# Patient Record
Sex: FEMALE | Race: WHITE | NOT HISPANIC OR LATINO | Employment: OTHER | URBAN - NONMETROPOLITAN AREA
[De-identification: names, ages, dates, MRNs, and addresses within clinical notes are randomized per-mention and may not be internally consistent; named-entity substitution may affect disease eponyms.]

---

## 2017-05-31 ENCOUNTER — TELEPHONE (OUTPATIENT)
Dept: NEUROLOGY | Facility: CLINIC | Age: 42
End: 2017-05-31

## 2017-07-10 RX ORDER — SUMATRIPTAN 100 MG/1
100 TABLET, FILM COATED ORAL ONCE AS NEEDED
Qty: 9 TABLET | Refills: 2 | Status: SHIPPED | OUTPATIENT
Start: 2017-07-10 | End: 2017-07-18 | Stop reason: SDUPTHER

## 2017-07-10 RX ORDER — RIZATRIPTAN BENZOATE 10 MG/1
10 TABLET ORAL ONCE AS NEEDED
Qty: 9 TABLET | Refills: 2 | Status: SHIPPED | OUTPATIENT
Start: 2017-07-10 | End: 2017-07-18 | Stop reason: SDUPTHER

## 2017-07-10 NOTE — TELEPHONE ENCOUNTER
She does not have any refills left on her Imitrex or her Maxalt. She uses [facility].     She also would like to discuss Botox at her follow up.

## 2017-07-18 ENCOUNTER — OFFICE VISIT (OUTPATIENT)
Dept: NEUROLOGY | Facility: CLINIC | Age: 42
End: 2017-07-18

## 2017-07-18 VITALS
HEART RATE: 72 BPM | DIASTOLIC BLOOD PRESSURE: 80 MMHG | SYSTOLIC BLOOD PRESSURE: 124 MMHG | WEIGHT: 220 LBS | BODY MASS INDEX: 36.65 KG/M2 | HEIGHT: 65 IN

## 2017-07-18 DIAGNOSIS — G43.119 INTRACTABLE MIGRAINE WITH AURA WITHOUT STATUS MIGRAINOSUS: Primary | ICD-10-CM

## 2017-07-18 PROCEDURE — 64400 NJX AA&/STRD TRIGEMINAL NRV: CPT | Performed by: PHYSICIAN ASSISTANT

## 2017-07-18 PROCEDURE — 99214 OFFICE O/P EST MOD 30 MIN: CPT | Performed by: PHYSICIAN ASSISTANT

## 2017-07-18 RX ORDER — ONDANSETRON 8 MG/1
8 TABLET, ORALLY DISINTEGRATING ORAL EVERY 8 HOURS PRN
Qty: 30 TABLET | Refills: 3 | Status: SHIPPED | OUTPATIENT
Start: 2017-07-18 | End: 2023-01-25

## 2017-07-18 RX ORDER — RIZATRIPTAN BENZOATE 10 MG/1
10 TABLET ORAL ONCE AS NEEDED
Qty: 9 TABLET | Refills: 2 | Status: SHIPPED | OUTPATIENT
Start: 2017-07-18 | End: 2018-04-17 | Stop reason: SDUPTHER

## 2017-07-18 RX ORDER — SUMATRIPTAN 100 MG/1
100 TABLET, FILM COATED ORAL ONCE AS NEEDED
Qty: 9 TABLET | Refills: 3 | Status: SHIPPED | OUTPATIENT
Start: 2017-07-18 | End: 2023-01-25

## 2017-07-18 RX ORDER — METHYLPREDNISOLONE ACETATE 40 MG/ML
12.31 INJECTION, SUSPENSION INTRA-ARTICULAR; INTRALESIONAL; INTRAMUSCULAR; SOFT TISSUE ONCE
Status: COMPLETED | OUTPATIENT
Start: 2017-07-18 | End: 2017-07-18

## 2017-07-18 RX ORDER — TOPIRAMATE 25 MG/1
25 TABLET ORAL 2 TIMES DAILY
Qty: 60 TABLET | Refills: 3 | Status: SHIPPED | OUTPATIENT
Start: 2017-07-18 | End: 2017-08-24 | Stop reason: SDUPTHER

## 2017-07-18 RX ADMIN — METHYLPREDNISOLONE ACETATE 12.31 MG: 40 INJECTION, SUSPENSION INTRA-ARTICULAR; INTRALESIONAL; INTRAMUSCULAR; SOFT TISSUE at 10:52

## 2017-07-18 RX ADMIN — METHYLPREDNISOLONE ACETATE 12.31 MG: 40 INJECTION, SUSPENSION INTRA-ARTICULAR; INTRALESIONAL; INTRAMUSCULAR; SOFT TISSUE at 10:50

## 2017-07-18 NOTE — PROGRESS NOTES
Procedure   Nerve Block  Date/Time: 7/18/2017 12:59 PM  Performed by: CYNTHIA DE OLIVEIRA  Authorized by: CYNTHIA DE OLIVEIRA   Consent: Verbal consent obtained.  Risks and benefits: risks, benefits and alternatives were discussed  Consent given by: patient  Patient understanding: patient states understanding of the procedure being performed  Patient consent: the patient's understanding of the procedure matches consent given  Procedure consent: procedure consent matches procedure scheduled  Relevant documents: relevant documents present and verified  Test results: test results available and properly labeled  Site marked: the operative site was not marked  Imaging studies: imaging studies not available  Required items: required blood products, implants, devices, and special equipment available  Patient identity confirmed: verbally with patient  Indications: pain relief  Body area: head  Nerve: trigeminal  Laterality: left  Sedation:  Patient sedated: no    Preparation: Patient was prepped and draped in the usual sterile fashion.  Patient position: sitting  Needle size: 27 G  Location technique: anatomical landmarks  Local Anesthetic: lidocaine 1% without epinephrine   Anesthetic total: 0.7 mL  Outcome: pain improved  Patient tolerance: Patient tolerated the procedure well with no immediate complications

## 2017-07-18 NOTE — PROGRESS NOTES
"Subjective   Britta Patel is a 41 y.o. female is here today for follow-up.    HPI Comments: The patient returns in follow-up, she has not been seen in almost a year.  During this time she is continues to have migraine headaches.  She is also had some issues with her underlying psychiatric disease and has been hospitalized.  She was recently hospitalized in [place].  She returns today with increased frequency and intensity of her headaches.    Patient also has episodes that she describes as \"nonepileptic seizures\" that are now occurring with decreased regularity in intensity.  She feels this is been a positive response to Tegretol which has been maintained since she was last seen.    The patient is no longer taking topiramate which she feels was very helpful for her headaches, but had caused him side effects which in retrospect she feels were not very bad.    Patient is continuing to take Imitrex and occasional Maxalt as she had previously, with mixed results.    Her headaches continue to be associated with photophobia, phonophobia, nausea, emesis, loss of balance and are frequently associated with left sided dominant pain.  She typically has an aura starting about 2 hours prior to the headache.  She is having typical aura symptoms today.    Migraine    This is a chronic problem. The current episode started more than 1 year ago. The problem occurs intermittently (Greater than 15 headache days per month, greater than 4 hours per occurrence). The problem has been gradually worsening. The pain is located in the bilateral, frontal, retro-orbital and temporal region. The pain does not radiate. The pain quality is similar to prior headaches. The quality of the pain is described as aching, throbbing and pulsating. The pain is severe. Associated symptoms include blurred vision, dizziness, eye pain, a loss of balance, nausea, phonophobia, photophobia, scalp tenderness, seizures, a visual change and vomiting. Pertinent " negatives include no fever, hearing loss or weakness. The symptoms are aggravated by activity, bright light, fatigue, emotional stress, noise and Valsalva. She has tried acetaminophen, antidepressants, beta blockers, cold packs, darkened room, Excedrin, injectable narcotics, NSAIDs and triptans for the symptoms. The treatment provided mild relief. Her past medical history is significant for migraine headaches and migraines in the family.       The following portions of the patient's history were reviewed and updated as appropriate: allergies, current medications, past family history, past medical history, past social history, past surgical history and problem list.    Review of Systems   Constitutional: Positive for fatigue. Negative for chills and fever.   HENT: Negative for hearing loss, nosebleeds and trouble swallowing.    Eyes: Positive for blurred vision, photophobia, pain and visual disturbance.   Respiratory: Negative.    Cardiovascular: Negative.    Gastrointestinal: Positive for nausea and vomiting.   Endocrine: Negative.    Genitourinary: Negative.    Musculoskeletal: Negative.    Skin: Negative.    Allergic/Immunologic: Negative.    Neurological: Positive for dizziness, seizures, headaches and loss of balance. Negative for facial asymmetry, speech difficulty and weakness.   Hematological: Negative.    Psychiatric/Behavioral: Positive for confusion, decreased concentration, dysphoric mood and sleep disturbance. The patient is nervous/anxious.        Objective   Physical Exam   Constitutional: She is oriented to person, place, and time. Vital signs are normal. She appears well-developed and well-nourished. No distress.   HENT:   Head: Normocephalic and atraumatic.   Right Ear: Hearing and external ear normal.   Left Ear: Hearing and external ear normal.   Nose: Nose normal.   Mouth/Throat: Uvula is midline, oropharynx is clear and moist and mucous membranes are normal.   Bitemporal left greater than right  tenderness to palpation with symmetric temporalis pulses.   Eyes: Conjunctivae, EOM and lids are normal. Pupils are equal, round, and reactive to light. No scleral icterus.   Fundoscopic exam:       The right eye shows no hemorrhage and no papilledema. The right eye shows red reflex.        The left eye shows no hemorrhage and no papilledema. The left eye shows red reflex.   Neck: Normal range of motion and phonation normal. No spinous process tenderness and no muscular tenderness present. Carotid bruit is not present. Normal range of motion present. No thyroid mass and no thyromegaly present.   Cardiovascular: Normal rate, regular rhythm, S1 normal, S2 normal and normal heart sounds.    No murmur heard.  Pulmonary/Chest: Effort normal and breath sounds normal. No stridor. No respiratory distress. She has no wheezes. She has no rales.   Musculoskeletal: Normal range of motion. She exhibits no edema or tenderness.        Lumbar back: Normal.   Lymphadenopathy:     She has no cervical adenopathy.   Neurological: She is alert and oriented to person, place, and time. She has normal strength. She displays no atrophy and no tremor. No cranial nerve deficit or sensory deficit. She exhibits normal muscle tone. Coordination and gait normal. GCS eye subscore is 4. GCS verbal subscore is 5. GCS motor subscore is 6.   Reflex Scores:       Tricep reflexes are 2+ on the right side and 2+ on the left side.       Bicep reflexes are 2+ on the right side and 2+ on the left side.       Brachioradialis reflexes are 2+ on the right side and 2+ on the left side.       Patellar reflexes are 2+ on the right side and 2+ on the left side.       Achilles reflexes are 2+ on the right side and 2+ on the left side.  Skin: Skin is warm and dry. No ecchymosis, no lesion and no rash noted. No cyanosis. Nails show no clubbing.   Psychiatric: She has a normal mood and affect. Her speech is normal and behavior is normal. Judgment and thought content  normal. She is not actively hallucinating. Cognition and memory are normal. She is attentive.   Nursing note and vitals reviewed.        Assessment/Plan   Britta was seen today for migraine and seizures.    Diagnoses and all orders for this visit:    Intractable migraine with aura without status migrainosus  -     topiramate (TOPAMAX) 25 MG tablet; Take 1 tablet by mouth 2 (Two) Times a Day.  -     methylPREDNISolone acetate (DEPO-medrol) injection 12.31 mg; 0.31 mL by Peripheral Nerve route 1 (One) Time.  -     methylPREDNISolone acetate (DEPO-medrol) injection 12.31 mg; 0.31 mL by Peripheral Nerve route 1 (One) Time.  -     Ambulatory Referral to Neurology  -     Ambulatory Referral to Neurology  -     SUMAtriptan (IMITREX) 100 MG tablet; Take 1 tablet by mouth 1 (One) Time As Needed for Migraine. Do not exceed 2 in 24 hours  -     rizatriptan (MAXALT) 10 MG tablet; Take 1 tablet by mouth 1 (One) Time As Needed for Migraine for up to 1 dose. May repeat in 2 hours if needed  -     ondansetron ODT (ZOFRAN-ODT) 8 MG disintegrating tablet; Take 1 tablet by mouth Every 8 (Eight) Hours As Needed for Nausea or Vomiting.  -     CBC & Differential  -     Hepatic Function Panel  -     Carbamazepine Level, Total    The patient will reinitiate topiramate titrating to 25 mg twice per day over the next 2 weeks.    She will continue sumatriptan, Zofran and Maxalt as she has previously on an as-needed basis.    Patient is a candidate for Botox therapy given the nature, frequency and intensity of her headaches.    The patient is a candidate for bilateral auricular temporal branch blocks, given her evolving headache today we have offered to accomplish these today.    The patient relates that she has not had any recent blood work and we have recommended a CBC, LFT and Tegretol level.    20 minutes of a 30 minute outpatient visit was spent in counseling and coordination of care today.  This time was above and beyond that required  for bilateral auricular temporal branch blocks which are documented separately.          EMR Dragon transcription disclaimer:  Much of this encounter note is an electronic transcription/translation of spoken language to printed text.  The electronic translation of spoken language may permit erroneous, or at times, nonsensical words or phrases to be inadvertently transcribed.  The author has reviewed the note for such errors, however some may still exist.

## 2017-07-18 NOTE — PROGRESS NOTES
Procedure   Nerve Block  Date/Time: 7/18/2017 1:02 PM  Performed by: CYNTHIA DE OLIVEIRA  Authorized by: CYNTHIA DE OLIVEIRA   Consent: Verbal consent obtained.  Risks and benefits: risks, benefits and alternatives were discussed  Consent given by: patient  Patient understanding: patient states understanding of the procedure being performed  Patient consent: the patient's understanding of the procedure matches consent given  Procedure consent: procedure consent matches procedure scheduled  Relevant documents: relevant documents present and verified  Test results: test results available and properly labeled  Site marked: the operative site was not marked  Imaging studies: imaging studies not available  Required items: required blood products, implants, devices, and special equipment available  Patient identity confirmed: verbally with patient  Indications: pain relief  Body area: head  Nerve: trigeminal  Laterality: right  Sedation:  Patient sedated: no    Preparation: Patient was prepped and draped in the usual sterile fashion.  Patient position: sitting  Needle size: 27 G  Location technique: anatomical landmarks  Local Anesthetic: lidocaine 1% without epinephrine   Anesthetic total: 0.7 mL  Outcome: pain improved  Patient tolerance: Patient tolerated the procedure well with no immediate complications

## 2017-08-07 RX ORDER — BUTALBITAL, ACETAMINOPHEN AND CAFFEINE 50; 325; 40 MG/1; MG/1; MG/1
1 TABLET ORAL EVERY 6 HOURS PRN
Qty: 15 TABLET | Refills: 0 | OUTPATIENT
Start: 2017-08-07 | End: 2017-08-25

## 2017-08-24 ENCOUNTER — TELEPHONE (OUTPATIENT)
Dept: NEUROLOGY | Facility: CLINIC | Age: 42
End: 2017-08-24

## 2017-08-24 DIAGNOSIS — G43.119 INTRACTABLE MIGRAINE WITH AURA WITHOUT STATUS MIGRAINOSUS: Primary | ICD-10-CM

## 2017-08-24 RX ORDER — TOPIRAMATE 50 MG/1
50 TABLET, FILM COATED ORAL 2 TIMES DAILY
Qty: 60 TABLET | Refills: 3 | Status: SHIPPED | OUTPATIENT
Start: 2017-08-24 | End: 2023-01-25 | Stop reason: ALTCHOICE

## 2017-08-24 NOTE — TELEPHONE ENCOUNTER
Pt has called with a migraine for the last 18 hours. She would like to know if she could come in for another nerve block or increase her medication.     I did not want her going in to the weekend with this migraine and end up at the ED.   She is not having any relief with the abortive medications she is prescribed.   I have put her on the schedule tomorrow 8-25-17 at 1:20pm for the procedure only. And I did explain to her how the visit would be a procedure only due to working her in on the schedule.     She did let me know that she would like for her Topamax increased if possible and to make BERNA Lockhart aware of her request before the appointment- being that it will be a procedure only appt.

## 2017-08-24 NOTE — TELEPHONE ENCOUNTER
I sent in a prescription for Topamax 50 mg twice per day.    The procedure visit that you have scheduled is okay with me.

## 2017-08-25 ENCOUNTER — OFFICE VISIT (OUTPATIENT)
Dept: NEUROLOGY | Facility: CLINIC | Age: 42
End: 2017-08-25

## 2017-08-25 VITALS
HEIGHT: 65 IN | HEART RATE: 76 BPM | BODY MASS INDEX: 36.65 KG/M2 | WEIGHT: 220 LBS | SYSTOLIC BLOOD PRESSURE: 120 MMHG | DIASTOLIC BLOOD PRESSURE: 80 MMHG

## 2017-08-25 DIAGNOSIS — G43.119 INTRACTABLE MIGRAINE WITH AURA WITHOUT STATUS MIGRAINOSUS: Primary | ICD-10-CM

## 2017-08-25 PROCEDURE — 64400 NJX AA&/STRD TRIGEMINAL NRV: CPT | Performed by: PHYSICIAN ASSISTANT

## 2017-08-25 RX ORDER — METHYLPREDNISOLONE ACETATE 40 MG/ML
12.31 INJECTION, SUSPENSION INTRA-ARTICULAR; INTRALESIONAL; INTRAMUSCULAR; SOFT TISSUE ONCE
Status: COMPLETED | OUTPATIENT
Start: 2017-08-25 | End: 2017-08-25

## 2017-08-25 RX ADMIN — METHYLPREDNISOLONE ACETATE 12.31 MG: 40 INJECTION, SUSPENSION INTRA-ARTICULAR; INTRALESIONAL; INTRAMUSCULAR; SOFT TISSUE at 13:53

## 2017-08-25 NOTE — PROGRESS NOTES
Procedure   Nerve Block  Date/Time: 8/25/2017 1:54 PM  Performed by: CYNTHIA DE OLIVEIRA  Authorized by: CYNTHIA DE OLIVEIRA   Consent: Verbal consent obtained.  Risks and benefits: risks, benefits and alternatives were discussed  Consent given by: patient  Patient understanding: patient states understanding of the procedure being performed  Patient consent: the patient's understanding of the procedure matches consent given  Procedure consent: procedure consent matches procedure scheduled  Relevant documents: relevant documents present and verified  Test results: test results available and properly labeled  Site marked: the operative site was not marked  Imaging studies: imaging studies not available  Required items: required blood products, implants, devices, and special equipment available  Patient identity confirmed: verbally with patient  Indications: pain relief  Body area: head  Nerve: trigeminal  Laterality: left    Sedation:  Patient sedated: no  Preparation: Patient was prepped and draped in the usual sterile fashion.  Patient position: sitting  Needle size: 27 G  Location technique: anatomical landmarks  Local Anesthetic: lidocaine 1% without epinephrine  Anesthetic total: 0.7 mL  Outcome: pain improved  Patient tolerance: Patient tolerated the procedure well with no immediate complications

## 2017-08-25 NOTE — PROGRESS NOTES
Procedure   Nerve Block  Date/Time: 8/25/2017 1:55 PM  Performed by: CYNTHIA DE OLIVEIRA  Authorized by: CYNTHIA DE OLIVEIRA   Consent: Verbal consent obtained.  Risks and benefits: risks, benefits and alternatives were discussed  Consent given by: patient  Patient understanding: patient states understanding of the procedure being performed  Patient consent: the patient's understanding of the procedure matches consent given  Procedure consent: procedure consent matches procedure scheduled  Relevant documents: relevant documents present and verified  Test results: test results available and properly labeled  Site marked: the operative site was not marked  Imaging studies: imaging studies not available  Required items: required blood products, implants, devices, and special equipment available  Patient identity confirmed: verbally with patient  Indications: pain relief  Body area: head  Nerve: trigeminal  Laterality: right    Sedation:  Patient sedated: no  Preparation: Patient was prepped and draped in the usual sterile fashion.  Patient position: sitting  Needle size: 27 G  Location technique: anatomical landmarks  Local Anesthetic: lidocaine 1% without epinephrine  Anesthetic total: 0.7 mL  Outcome: pain unchanged  Patient tolerance: Patient tolerated the procedure well with no immediate complications

## 2017-09-11 DIAGNOSIS — Z79.899 MEDICATION MANAGEMENT: Primary | ICD-10-CM

## 2018-04-17 DIAGNOSIS — G43.119 INTRACTABLE MIGRAINE WITH AURA WITHOUT STATUS MIGRAINOSUS: ICD-10-CM

## 2018-04-18 RX ORDER — RIZATRIPTAN BENZOATE 10 MG/1
TABLET ORAL
Qty: 9 TABLET | Refills: 0 | Status: SHIPPED | OUTPATIENT
Start: 2018-04-18

## 2019-09-16 ENCOUNTER — TRANSCRIBE ORDERS (OUTPATIENT)
Dept: PHYSICAL THERAPY | Facility: CLINIC | Age: 44
End: 2019-09-16

## 2019-09-16 ENCOUNTER — OFFICE VISIT (OUTPATIENT)
Dept: PHYSICAL THERAPY | Facility: CLINIC | Age: 44
End: 2019-09-16

## 2019-09-16 DIAGNOSIS — S92.302D FRACTURE OF UNSPECIFIED METATARSAL BONE(S), LEFT FOOT, SUBSEQUENT ENCOUNTER FOR FRACTURE WITH ROUTINE HEALING: Primary | ICD-10-CM

## 2019-09-16 DIAGNOSIS — S92.352D CLOSED FRACTURE OF FIFTH METATARSAL BONE OF LEFT FOOT WITH ROUTINE HEALING, PHYSEAL INVOLVEMENT UNSPECIFIED, SUBSEQUENT ENCOUNTER: Primary | ICD-10-CM

## 2019-09-16 PROCEDURE — 97110 THERAPEUTIC EXERCISES: CPT | Performed by: PHYSICAL THERAPIST

## 2019-09-16 PROCEDURE — 97161 PT EVAL LOW COMPLEX 20 MIN: CPT | Performed by: PHYSICAL THERAPIST

## 2019-09-16 NOTE — PROGRESS NOTES
Physical Therapy Initial Evaluation and Plan of Care      Patient: Britta Patel   : 1975  Diagnosis/ICD-10 Code:  Closed fracture of fifth metatarsal bone of left foot with routine healing, physeal involvement unspecified, subsequent encounter [S92.352D]  Referring practitioner: NIURKA Avina*  Date of Initial Visit: 2019  Today's Date: 2019  Patient seen for 1 sessions    Recheck due: 10/7/19  MD appt: ; pt unsure of date           Subjective Questionnaire: LEFS:       Subjective Evaluation    History of Present Illness  Onset date: 2019; 4 weeks prior to surgery.  Date of surgery: 2019  Mechanism of injury: Presents s/p 5th MT fx with ORIF 19. Fractured foot ~ 4 weeks prior to that. Wasn't healing well so they opted to do surgery. Tripped over the threshold in her front doorway. Will be in the boot until the end of October. Pt she is able to bear weight through her heel at this time with boot donned. Ankle feels stiff. Most of her pain in located at her ankle, arch of foot, and lateral foot. Has been doing some light ROM activities. Reports fracture is healing well at this time.      Patient Occupation: Pt not working at this time. Unemployed.   Precautions and Work Restrictions: WBAT in boot on heelQuality of life: good    Pain  Current pain rating: 3  At best pain rating: 3  At worst pain ratin  Quality: throbbing and dull ache  Relieving factors: change in position, rest and ice  Aggravating factors: ambulation, stairs, prolonged positioning and sleeping  Progression: no change    Social Support  Lives in: one-story house (Has 3 steps to enter home)  Lives with: alone (Has been staying with parents at this time since the surgery)    Patient Goals  Patient goals for therapy: decreased edema, decreased pain, improved balance and increased strength             Objective       Palpation   Left   Tenderness of the lateral gastrocnemius and medial  gastrocnemius.     Tenderness   Left Ankle/Foot   Tenderness in the dorsum foot, metatarsal head and plantar fascia.     Right Ankle/Foot   No tenderness.     Neurological Testing     Sensation     Ankle/Foot   Left Ankle/Foot   Intact: light touch    Right Ankle/Foot   Intact: light touch     Active Range of Motion   Left Ankle/Foot   Plantar flexion: 34 degrees   Inversion: 12 degrees   Eversion: 8 degrees     Right Ankle/Foot   Dorsiflexion (ke): 10 degrees   Plantar flexion: 48 degrees   Inversion: 32 degrees   Eversion: 20 degrees     Additional Active Range of Motion Details  L ankle DF: -5 deg    Passive Range of Motion   Left Ankle/Foot    Dorsiflexion (ke): 1 degrees     Joint Play   Left Ankle/Foot  Hypomobile in the talocrural joint, subtalar joint and midfoot.     Strength/Myotome Testing     Right Ankle/Foot   Normal strength    Additional Strength Details  Formal L ankle MMT deferred due to lack of AROM/pain         Assessment & Plan     Assessment  Impairments: abnormal gait, abnormal or restricted ROM, impaired balance, impaired physical strength, lacks appropriate home exercise program and pain with function  Assessment details: Pt is a 43 y.o. Female presenting s/p L 5th MT fx with ORIF on 7/5/19. Pt demonstrates an overall decrease in L ankle AROM, strength, & stability which is limiting performance of functional mobility at this time. Pt will benefit from skilled PT services to address these deficits for improvements in overall L ankle ROM, functional strength/stability, gait mechanics, dynamic balance, and overall tolerance to daily functional activities & mobility.  Prognosis: good  Functional Limitations: walking, uncomfortable because of pain and standing  Goals  Plan Goals: STG's by 10/7/19  1) Demonstrate independence/compliance in HEP  2) Tolerate 45 minute treatment session without increased pain  3) Demonstrate improved L ankle DF AROM to 2 deg  4) Demonstrate improved L ankle PF AROM to  "45 deg or greater  5) Demonstrate improved L ankle eversion AROM to 12 deg    LTG's by 11/4/19  1) Subjectively report 60% improvement or greater  2) Improve LEFS score to 40/80 or greater  3) Demonstrate improved L ankle MMT to 4+/5 in all planes  4) Ambulate independently without boot, good gait mechanics noted LLE  5) Demonstrate improved L ankle DF AROM to 8 deg or greater  6) Perform L SLS on level ground for 20\" or greater without LOB  7) Ascend/descend 5 steps x 5 with reciprocal pattern, no increased pain or compensation noted    Plan  Therapy options: will be seen for skilled physical therapy services  Planned modality interventions: cryotherapy and electrical stimulation/Russian stimulation  Planned therapy interventions: balance/weight-bearing training, functional ROM exercises, home exercise program, joint mobilization, manual therapy, soft tissue mobilization, neuromuscular re-education, stretching, therapeutic activities and strengthening  Duration in visits: 12  Treatment plan discussed with: patient  Plan details: Pt WBAT on L heel while in walking boot; wear at all times with weightbearing. Follow up with ROM activities, ankle strength/stability. STM/MFR to plantar fascial, med/lat gastroc. Gentle ankle/foot mobilizations. Progress towards weightbearing activities in the boot as tolerated. Will progress towards more advanced CK strengthening & balance activities once boot d/c'd.         Timed:  Manual Therapy:    6     mins  29170;  Therapeutic Exercise:    18     mins  39394;     Neuromuscular Liliana:        mins  52301;    Therapeutic Activity:          mins  93397;     Gait Training:           mins  57477;     Ultrasound:          mins  91323;    Electrical Stimulation:         mins  83114 ( );    Untimed:  Electrical Stimulation:         mins  31286 ( );  Mechanical Traction:         mins  09071;     Timed Treatment:   24   mins   Total Treatment:     40   mins    PT SIGNATURE: " Iona Cook, PT   DATE TREATMENT INITIATED: 9/16/2019    Initial Certification  Certification Period: 12/15/2019  I certify that the therapy services are furnished while this patient is under my care.  The services outlined above are required by this patient, and will be reviewed every 90 days.     PHYSICIAN: Sohan Collazo MD PhD      DATE:     Please sign and return via fax to  .. Thank you, [facility].

## 2019-09-19 ENCOUNTER — TREATMENT (OUTPATIENT)
Dept: PHYSICAL THERAPY | Facility: CLINIC | Age: 44
End: 2019-09-19

## 2019-09-19 DIAGNOSIS — S92.352D CLOSED FRACTURE OF FIFTH METATARSAL BONE OF LEFT FOOT WITH ROUTINE HEALING, PHYSEAL INVOLVEMENT UNSPECIFIED, SUBSEQUENT ENCOUNTER: Primary | ICD-10-CM

## 2019-09-19 PROCEDURE — 97140 MANUAL THERAPY 1/> REGIONS: CPT | Performed by: PHYSICAL THERAPIST

## 2019-09-19 PROCEDURE — 97110 THERAPEUTIC EXERCISES: CPT | Performed by: PHYSICAL THERAPIST

## 2019-09-19 NOTE — PROGRESS NOTES
"   Physical Therapy Daily Progress Note        Patient: Britta Patel   : 1975  Diagnosis/ICD-10 Code:  Closed fracture of fifth metatarsal bone of left foot with routine healing, physeal involvement unspecified, subsequent encounter [S92.352D]  Referring practitioner: NIURKA Avina*  Date of Initial Visit: Type: THERAPY  Noted: 2019  Today's Date: 2019  Patient seen for 2 sessions         Britta Patel reports: NA        Subjective Evaluation    History of Present Illness    Subjective comment: Patient reports she is having pain on lateral side of foot.Pain  Current pain rating: 3           Objective       Palpation     Additional Palpation Details  TTP on lateral L  foot     See Exercise, Manual, and Modality Logs for complete treatment.       Assessment & Plan     Assessment  Assessment details: Patient with increased pain with IV during PROM    Goals  Plan Goals: Plan Goals: STG's by 10/7/19  1) Demonstrate independence/compliance in HEP  2) Tolerate 45 minute treatment session without increased pain  3) Demonstrate improved L ankle DF AROM to 2 deg  4) Demonstrate improved L ankle PF AROM to 45 deg or greater  5) Demonstrate improved L ankle eversion AROM to 12 deg    LTG's by 19  1) Subjectively report 60% improvement or greater  2) Improve LEFS score to 40/80 or greater  3) Demonstrate improved L ankle MMT to 4+/5 in all planes  4) Ambulate independently without boot, good gait mechanics noted LLE  5) Demonstrate improved L ankle DF AROM to 8 deg or greater  6) Perform L SLS on level ground for 20\" or greater without LOB  7) Ascend/descend 5 steps x 5 with reciprocal pattern, no increased pain or compensation noted      Plan  Plan details: Continue to progress ROM, attempt gait mechanics with boot donned.        Progress per Plan of Care and Progress strengthening /stabilization /functional activity           Manual Therapy:    8     mins  80496;  Therapeutic " Exercise:    38     mins  98993;     Neuromuscular Liliana:        mins  61704;    Therapeutic Activity:          mins  96419;     Gait Training:           mins  11984;     Ultrasound:          mins  49761;    Electrical Stimulation:         mins  81964 ( );  Dry Needling          mins self-pay    Timed Treatment:   46    mins   Total Treatment:     49   mins    Arias Chang PTA  Physical Therapist

## 2019-09-23 ENCOUNTER — TREATMENT (OUTPATIENT)
Dept: PHYSICAL THERAPY | Facility: CLINIC | Age: 44
End: 2019-09-23

## 2019-09-23 DIAGNOSIS — S92.352D CLOSED FRACTURE OF FIFTH METATARSAL BONE OF LEFT FOOT WITH ROUTINE HEALING, PHYSEAL INVOLVEMENT UNSPECIFIED, SUBSEQUENT ENCOUNTER: Primary | ICD-10-CM

## 2019-09-23 PROCEDURE — 97140 MANUAL THERAPY 1/> REGIONS: CPT | Performed by: PHYSICAL THERAPIST

## 2019-09-23 PROCEDURE — 97110 THERAPEUTIC EXERCISES: CPT | Performed by: PHYSICAL THERAPIST

## 2019-09-23 NOTE — PROGRESS NOTES
"   Physical Therapy Daily Progress Note      Patient: Britta Patel   : 1975  Referring practitioner: NIURKA Avina*  Date of Initial Visit: Type: THERAPY  Noted: 2019  Today's Date: 2019  Patient seen for 3 sessions    Recheck due: 10/7/19  MD appt: ; pt unsure of date       Britta Patel reports: \"little better mobility\"        Subjective Evaluation    History of Present Illness    Subjective comment: pt states that she is doing fair today. Pt states that pain is minimalPain  Current pain rating: 3           Objective       Ambulation     Comments   Ambulates with L walking boot donned. Taken off for therex     See Exercise, Manual, and Modality Logs for complete treatment.       Assessment & Plan     Assessment  Assessment details: Pt had increase pain with wobble board circles- only complete 10 cw and 10 cww due to increase discomfort. Pt compliant with BID HEP. Tender to calf with manual. Discomfort noted with PROM    Goals  Plan Goals: Plan Goals: STG's by 10/7/19  1) Demonstrate independence/compliance in HEP  2) Tolerate 45 minute treatment session without increased pain  3) Demonstrate improved L ankle DF AROM to 2 deg  4) Demonstrate improved L ankle PF AROM to 45 deg or greater  5) Demonstrate improved L ankle eversion AROM to 12 deg    LTG's by 19  1) Subjectively report 60% improvement or greater  2) Improve LEFS score to 40/80 or greater  3) Demonstrate improved L ankle MMT to 4+/5 in all planes  4) Ambulate independently without boot, good gait mechanics noted LLE  5) Demonstrate improved L ankle DF AROM to 8 deg or greater  6) Perform L SLS on level ground for 20\" or greater without LOB  7) Ascend/descend 5 steps x 5 with reciprocal pattern, no increased pain or compensation noted      Plan  Duration in visits: 12  Plan details: Cont AROM/PROM activities        Visit Diagnoses:    ICD-10-CM ICD-9-CM   1. Closed fracture of fifth metatarsal bone " of left foot with routine healing, physeal involvement unspecified, subsequent encounter S92.352D V54.19       Progress per Plan of Care and Progress strengthening /stabilization /functional activity           Timed:  Manual Therapy:    8     mins  63343;  Therapeutic Exercise:    47     mins  39306;     Neuromuscular Liliaan:        mins  08595;    Therapeutic Activity:          mins  83474;     Gait Training:           mins  76364;     Ultrasound:          mins  78158;    Electrical Stimulation:         mins  49510 ( );    Untimed:  Electrical Stimulation:         mins  99184 ( );  Mechanical Traction:         mins  98752;     Timed Treatment:   55   mins   Total Treatment:     55   mins  Sharmin Davenport, [facility] [place]  Physical Therapist

## 2019-09-27 ENCOUNTER — TREATMENT (OUTPATIENT)
Dept: PHYSICAL THERAPY | Facility: CLINIC | Age: 44
End: 2019-09-27

## 2019-09-27 DIAGNOSIS — S92.352D CLOSED FRACTURE OF FIFTH METATARSAL BONE OF LEFT FOOT WITH ROUTINE HEALING, PHYSEAL INVOLVEMENT UNSPECIFIED, SUBSEQUENT ENCOUNTER: Primary | ICD-10-CM

## 2019-09-27 PROCEDURE — 97140 MANUAL THERAPY 1/> REGIONS: CPT | Performed by: PHYSICAL THERAPIST

## 2019-09-27 PROCEDURE — 97110 THERAPEUTIC EXERCISES: CPT | Performed by: PHYSICAL THERAPIST

## 2019-09-27 NOTE — PROGRESS NOTES
"   Physical Therapy Daily Progress Note      Patient: Britta Patel   : 1975  Referring practitioner: NIURKA Avina*  Date of Initial Visit: Type: THERAPY  Noted: 2019  Today's Date: 2019  Patient seen for 4 sessions    Recheck due: 10/7/19  MD appt: End ; pt unsure of date         Britta Patel reports: 0% improvement        Subjective Evaluation    History of Present Illness    Subjective comment: pt states that she might have over did it some yesterday because she was feeling better, but she is hurting some todayPain  Current pain ratin           Objective       Palpation   Left   Tenderness of the lateral gastrocnemius.     Additional Palpation Details  TTP to plantar fascia    Ambulation     Comments   Ambulates with walking boot on LLE     See Exercise, Manual, and Modality Logs for complete treatment.       Assessment & Plan     Assessment  Assessment details: Pt able to preform Tband IV/EV gentle-does have slight increase in pain. Pt continues to have tenderness to gastroc and plantar fascia with manual.    Goals  Plan Goals: STG's by 10/7/19  1) Demonstrate independence/compliance in HEP  2) Tolerate 45 minute treatment session without increased pain  3) Demonstrate improved L ankle DF AROM to 2 deg  4) Demonstrate improved L ankle PF AROM to 45 deg or greater  5) Demonstrate improved L ankle eversion AROM to 12 deg    LTG's by 19  1) Subjectively report 60% improvement or greater  2) Improve LEFS score to 40/80 or greater  3) Demonstrate improved L ankle MMT to 4+/5 in all planes  4) Ambulate independently without boot, good gait mechanics noted LLE  5) Demonstrate improved L ankle DF AROM to 8 deg or greater  6) Perform L SLS on level ground for 20\" or greater without LOB  7) Ascend/descend 5 steps x 5 with reciprocal pattern, no increased pain or compensation noted    Plan  Duration in visits: 12  Plan details: Try gentle ankle isometrics next " visit        Visit Diagnoses:    ICD-10-CM ICD-9-CM   1. Closed fracture of fifth metatarsal bone of left foot with routine healing, physeal involvement unspecified, subsequent encounter S92.352D V54.19       Progress per Plan of Care and Progress strengthening /stabilization /functional activity           Timed:  Manual Therapy:    10     mins  46134;  Therapeutic Exercise:    31     mins  48627;     Neuromuscular Liliana:        mins  72001;    Therapeutic Activity:          mins  87420;     Gait Training:           mins  90434;     Ultrasound:          mins  95177;    Electrical Stimulation:         mins  28664 ( );    Untimed:  Electrical Stimulation:         mins  88521 ( );  Mechanical Traction:         mins  81884;     Timed Treatment:   41   mins   Total Treatment:     41   mins  Sharmin Davenport [facility]  Physical Therapist

## 2019-10-02 ENCOUNTER — TREATMENT (OUTPATIENT)
Dept: PHYSICAL THERAPY | Facility: CLINIC | Age: 44
End: 2019-10-02

## 2019-10-02 DIAGNOSIS — S92.352D CLOSED FRACTURE OF FIFTH METATARSAL BONE OF LEFT FOOT WITH ROUTINE HEALING, PHYSEAL INVOLVEMENT UNSPECIFIED, SUBSEQUENT ENCOUNTER: Primary | ICD-10-CM

## 2019-10-02 PROCEDURE — 97140 MANUAL THERAPY 1/> REGIONS: CPT | Performed by: PHYSICAL THERAPIST

## 2019-10-02 PROCEDURE — 97110 THERAPEUTIC EXERCISES: CPT | Performed by: PHYSICAL THERAPIST

## 2019-10-02 NOTE — PROGRESS NOTES
"   Physical Therapy Daily Progress Note      Patient: Britta Patel   : 1975  Referring practitioner: NIURKA Avina*  Date of Initial Visit: Type: THERAPY  Noted: 2019  Today's Date: 10/2/2019  Patient seen for 5 sessions    Recheck due: 10/7/19  MD appt: End ; pt unsure of date       Britta Patel reports: 50% improvement    Subjective Evaluation    History of Present Illness    Subjective comment: Pt states her foot/ankle is feeling a lot better. Feels like mobility is getting better, still feels tight though.Pain  Current pain rating: 3  Location: L foot           Objective       Ambulation     Comments   Ambulates with mild antalgic gait with walking boot donned L. TTP medial>lat gastroc with muscle tightness noted.     See Exercise, Manual, and Modality Logs for complete treatment.       Assessment & Plan     Assessment  Assessment details: Did better with ankle inversion/eversion strengthening with isometrics vs tband. Educated pt on how to do that at home. Progressed to include hip/knee strengthening to which pt tolerated well. Improved pain reported post treatment.    Goals  Plan Goals: STG's by 10/7/19  1) Demonstrate independence/compliance in HEP  2) Tolerate 45 minute treatment session without increased pain  3) Demonstrate improved L ankle DF AROM to 2 deg  4) Demonstrate improved L ankle PF AROM to 45 deg or greater  5) Demonstrate improved L ankle eversion AROM to 12 deg    LTG's by 19  1) Subjectively report 60% improvement or greater  2) Improve LEFS score to 40/80 or greater  3) Demonstrate improved L ankle MMT to 4+/5 in all planes  4) Ambulate independently without boot, good gait mechanics noted LLE  5) Demonstrate improved L ankle DF AROM to 8 deg or greater  6) Perform L SLS on level ground for 20\" or greater without LOB  7) Ascend/descend 5 steps x 5 with reciprocal pattern, no increased pain or compensation noted    Plan  Duration in visits: " 12  Plan details: Measure AROM next visit. Continue hip/knee strengthening progressions and gentle ankle stability.        Visit Diagnoses:    ICD-10-CM ICD-9-CM   1. Closed fracture of fifth metatarsal bone of left foot with routine healing, physeal involvement unspecified, subsequent encounter S92.352D V54.19       Progress per Plan of Care and Progress strengthening /stabilization /functional activity           Timed:  Manual Therapy:    8     mins  43572;  Therapeutic Exercise:    36     mins  87064;     Neuromuscular Liliana:        mins  89265;    Therapeutic Activity:          mins  98880;     Gait Training:           mins  90598;     Ultrasound:          mins  34385;    Electrical Stimulation:         mins  71933 ( );    Untimed:  Electrical Stimulation:         mins  82891 ( );  Mechanical Traction:         mins  69262;     Timed Treatment:   44   mins   Total Treatment:     44   mins  Iona Cook, PT  Physical Therapist

## 2019-10-08 ENCOUNTER — TREATMENT (OUTPATIENT)
Dept: PHYSICAL THERAPY | Facility: CLINIC | Age: 44
End: 2019-10-08

## 2019-10-08 DIAGNOSIS — S92.352D CLOSED FRACTURE OF FIFTH METATARSAL BONE OF LEFT FOOT WITH ROUTINE HEALING, PHYSEAL INVOLVEMENT UNSPECIFIED, SUBSEQUENT ENCOUNTER: Primary | ICD-10-CM

## 2019-10-08 PROCEDURE — 97110 THERAPEUTIC EXERCISES: CPT | Performed by: PHYSICAL THERAPIST

## 2019-10-08 PROCEDURE — 97140 MANUAL THERAPY 1/> REGIONS: CPT | Performed by: PHYSICAL THERAPIST

## 2019-10-08 NOTE — PROGRESS NOTES
"   Physical Therapy Daily Progress Note      Patient: Britta Patel   : 1975  Referring practitioner: NIURKA Avina*  Date of Initial Visit: Type: THERAPY  Noted: 2019  Today's Date: 10/8/2019  Patient seen for 6 sessions    Recheck due: 10/7/19  MD appt: End ; pt unsure of date       Britta Patel reports: 50% improvement    Subjective Evaluation    History of Present Illness    Subjective comment: Pt states she feels like her mobility is continuing to get better. Pain getting better as well.Pain  Current pain ratin  Location: R foot           Objective       Ambulation     Comments   Ambulates with mild antalgic gait with walking boot donned L. TTP medial>lat gastroc with muscle tightness noted. TTP at 5th ray. Hypomobility with metatarsal glides.     See Exercise, Manual, and Modality Logs for complete treatment.       Assessment & Plan     Assessment  Assessment details: Pt did well with therex progressions for hip and knee strengthening. Continues with soreness with gentle ankle isometrics, mostly into eversion/inversion. Better performance noted with Guomai board activities. Improved observed mobility noted both passively and actively at ankle.    Goals  Plan Goals: STG's by 10/7/19  1) Demonstrate independence/compliance in HEP  2) Tolerate 45 minute treatment session without increased pain  3) Demonstrate improved L ankle DF AROM to 2 deg  4) Demonstrate improved L ankle PF AROM to 45 deg or greater  5) Demonstrate improved L ankle eversion AROM to 12 deg    LTG's by 19  1) Subjectively report 60% improvement or greater  2) Improve LEFS score to 40/80 or greater  3) Demonstrate improved L ankle MMT to 4+/5 in all planes  4) Ambulate independently without boot, good gait mechanics noted LLE  5) Demonstrate improved L ankle DF AROM to 8 deg or greater  6) Perform L SLS on level ground for 20\" or greater without LOB  7) Ascend/descend 5 steps x 5 with reciprocal " pattern, no increased pain or compensation noted    Plan  Duration in visits: 12  Plan details: Recheck next visit.        Visit Diagnoses:    ICD-10-CM ICD-9-CM   1. Closed fracture of fifth metatarsal bone of left foot with routine healing, physeal involvement unspecified, subsequent encounter S92.352D V54.19       Progress per Plan of Care and Progress strengthening /stabilization /functional activity           Timed:  Manual Therapy:    12     mins  48383;  Therapeutic Exercise:    36     mins  22444;     Neuromuscular Liliana:        mins  45845;    Therapeutic Activity:          mins  06606;     Gait Training:           mins  74636;     Ultrasound:          mins  01286;    Electrical Stimulation:         mins  34012 ( );    Untimed:  Electrical Stimulation:         mins  50078 ( );  Mechanical Traction:         mins  89352;     Timed Treatment:   48   mins   Total Treatment:     48   mins  Iona Cook, PT  Physical Therapist

## 2019-10-10 ENCOUNTER — TREATMENT (OUTPATIENT)
Dept: PHYSICAL THERAPY | Facility: CLINIC | Age: 44
End: 2019-10-10

## 2019-10-10 DIAGNOSIS — S92.352D CLOSED FRACTURE OF FIFTH METATARSAL BONE OF LEFT FOOT WITH ROUTINE HEALING, PHYSEAL INVOLVEMENT UNSPECIFIED, SUBSEQUENT ENCOUNTER: Primary | ICD-10-CM

## 2019-10-10 PROCEDURE — 97110 THERAPEUTIC EXERCISES: CPT | Performed by: PHYSICAL THERAPIST

## 2019-10-10 PROCEDURE — 97140 MANUAL THERAPY 1/> REGIONS: CPT | Performed by: PHYSICAL THERAPIST

## 2019-10-10 NOTE — PROGRESS NOTES
Re-Assessment / Re-Certification      Patient: Britta Patel   : 1975  Diagnosis/ICD-10 Code:  Closed fracture of fifth metatarsal bone of left foot with routine healing, physeal involvement unspecified, subsequent encounter [S92.352D]  Referring practitioner: ZAMZAM Avina  Date of Initial Visit: Type: THERAPY  Noted: 2019  Today's Date: 10/10/2019  Patient seen for 7 sessions    Recert due: 10/31/19  MD appt: 10/21/19    Subjective:   Britta Patel reports: 40% improvement  Subjective Questionnaire: LEFS:   Clinical Progress: improved  Home Program Compliance: Yes  Treatment has included: therapeutic exercise, neuromuscular re-education, manual therapy, therapeutic activity, gait training and cryotherapy    Subjective   Objective       Observations   Left Ankle/Foot   Positive for edema.     Additional Observation Details  No acute distress. Presents with walking boot LLE. Antalgic gait present LLE. Continues with some edema at L dorsal foot but improving.    Palpation   Left   Tenderness of the lateral gastrocnemius and medial gastrocnemius.     Tenderness   Left Ankle/Foot   Tenderness in the dorsum foot, metatarsal head and plantar fascia.     Right Ankle/Foot   No tenderness.     Additional Tenderness Details  TTP along L 5th ray, greatest distally.    Neurological Testing     Sensation     Ankle/Foot   Left Ankle/Foot   Intact: light touch    Right Ankle/Foot   Intact: light touch     Active Range of Motion   Left Ankle/Foot   Dorsiflexion (ke): 4 degrees   Plantar flexion: 38 degrees   Inversion: 20 degrees   Eversion: 8 degrees     Right Ankle/Foot   Normal active range of motion    Passive Range of Motion   Left Ankle/Foot    Dorsiflexion (ke): 8 degrees     Joint Play   Left Ankle/Foot  Hypomobile in the talocrural joint, subtalar joint and midfoot.     Additional Joint Play Details  Some mild pain/discomfort reported at times with subtalar  mobilizations    Strength/Myotome Testing     Right Ankle/Foot   Normal strength    Additional Strength Details  Formal L ankle MMT deferred due to lack of AROM/pain and MD precautions     Assessment & Plan     Assessment  Impairments: abnormal gait, abnormal or restricted ROM, activity intolerance, impaired balance, impaired physical strength and pain with function  Assessment details: Pt is progressing fairly well overall with PT at this time. Good improvements noted in overall L ankle mobility/flexibility. Activity tolerance is slowly progressing as pain levels are improving. Limited with overall CKC activities due to boot wear/precautions. Doing well overall with hip and knee strengthening activities. Pt still limited greatest in DF and eversion. Pt doing well with isometric ankle strengthening, still has some pain when using tband vs manual resistance. Pt still quite TTP at plantar fascia/longitudinal arches and at lateral foot/5th ray. Some improved mobility noted at subtalar joint with mobilizations. Pt compliant with HEP and added standing hip ext, abd, and standing hamstring curls to HEP. Pt will continue to benefit from skilled PT services to address these deficits for improvements in overall functional strength/stability, functional ROM, balance/proprioception, gait mechanics, and overall tolerance to daily functional activities.  Prognosis: good  Functional Limitations: walking, uncomfortable because of pain and standing  Goals  Plan Goals: STG's by 10/24/19  1) Demonstrate independence/compliance in HEP - MET  2) Tolerate 45 minute treatment session without increased pain - partially met  3) Demonstrate improved L ankle DF AROM to 2 deg - MET  4) Demonstrate improved L ankle PF AROM to 40 deg or greater - goal revised  5) Demonstrate improved L ankle eversion AROM to 12 deg - partially met    LTG's by 11/14/19  1) Subjectively report 60% improvement or greater  2) Improve LEFS score to 40/80 or  "greater  3) Demonstrate improved L ankle MMT to 4+/5 in all planes  4) Ambulate independently without boot, good gait mechanics noted LLE  5) Demonstrate improved L ankle DF AROM to 8 deg or greater  6) Perform L SLS on level ground for 20\" or greater without LOB  7) Ascend/descend 5 steps x 5 with reciprocal pattern, no increased pain or compensation noted    Plan  Therapy options: will be seen for skilled physical therapy services  Planned modality interventions: cryotherapy and electrical stimulation/Russian stimulation  Planned therapy interventions: balance/weight-bearing training, flexibility, functional ROM exercises, gait training, home exercise program, joint mobilization, manual therapy, neuromuscular re-education, soft tissue mobilization, strengthening, stretching and therapeutic activities  Duration in visits: 10  Treatment plan discussed with: patient  Plan details: Continue to progress overall LLE strengthening and ankle stability as able. Try tband next visit vs isometrics for resisted 4-way. Possible sidelying hip abd next. Continue manual interventions. Pt goes back to MD on 10/21/19. Will progress pt as MD advises at that point.        Visit Diagnoses:    ICD-10-CM ICD-9-CM   1. Closed fracture of fifth metatarsal bone of left foot with routine healing, physeal involvement unspecified, subsequent encounter S92.352D V54.19       Progress toward previous goals: Partially Met    New Goals  Short-term goals (STG): Continue current  Long-term goals (LTG): Continue current      Recommendations: Continue as planned  Timeframe: 1 month  Prognosis to achieve goals: good    PT Signature: Iona Cook, PT      Based upon review of the patient's progress and continued therapy plan, it is my medical opinion that Britta Patel should continue physical therapy treatment at [facility]  [address] [facility] [address] [phone].    Signature: " __________________________________  Sohan Collazo MD PhD    Timed:  Manual Therapy:    11     mins  77858;  Therapeutic Exercise:    33     mins  84890;     Neuromuscular Liliana:        mins  15565;    Therapeutic Activity:          mins  14765;     Gait Training:           mins  13866;     Ultrasound:          mins  89841;    Electrical Stimulation:         mins  34195 ( );    Untimed:  Electrical Stimulation:         mins  45949 ( );  Mechanical Traction:         mins  20351;     Timed Treatment:   44   mins   Total Treatment:     44   mins

## 2019-10-15 ENCOUNTER — TREATMENT (OUTPATIENT)
Dept: PHYSICAL THERAPY | Facility: CLINIC | Age: 44
End: 2019-10-15

## 2019-10-15 DIAGNOSIS — S92.352D CLOSED FRACTURE OF FIFTH METATARSAL BONE OF LEFT FOOT WITH ROUTINE HEALING, PHYSEAL INVOLVEMENT UNSPECIFIED, SUBSEQUENT ENCOUNTER: Primary | ICD-10-CM

## 2019-10-15 PROCEDURE — 97110 THERAPEUTIC EXERCISES: CPT | Performed by: PHYSICAL THERAPIST

## 2019-10-15 PROCEDURE — 97140 MANUAL THERAPY 1/> REGIONS: CPT | Performed by: PHYSICAL THERAPIST

## 2019-10-15 NOTE — PROGRESS NOTES
Physical Therapy Daily Progress Note      Patient: Britta Patel   : 1975  Referring practitioner: NIURKA Avina*  Date of Initial Visit: Type: THERAPY  Noted: 2019  Today's Date: 10/15/2019  Patient seen for 8 sessions    Recert due: 10/31/19  MD appt: 10/21/19       Britta Patel reports: 40% improvement    Subjective Evaluation    History of Present Illness    Subjective comment: Pt states her ankle/foot is doing much better, not hurting at all currently. Had some muscle cramping in her calf and anterior tib over the weekend but it's better.Pain  Current pain ratin           Objective       Ambulation     Comments   No acute distress. Presents with walking boot LLE. Antalgic gait present LLE. Continues with some mild edema at L dorsal foot but improving.     See Exercise, Manual, and Modality Logs for complete treatment.       Assessment & Plan     Assessment  Assessment details: Able to progress towards ankle strengthening with tband this date. Some mild discomfort with inv>eversion so only 10 reps performed. No issues with PF or DF. Continues with tightness with subtalar/talocrural mobilizations, isidro with eversion. Able to progress to seated BAPS board this date. Good effort given.  Prognosis: good    Goals  Plan Goals: STG's by 10/24/19  1) Demonstrate independence/compliance in HEP - MET  2) Tolerate 45 minute treatment session without increased pain - partially met  3) Demonstrate improved L ankle DF AROM to 2 deg - MET  4) Demonstrate improved L ankle PF AROM to 40 deg or greater - goal revised  5) Demonstrate improved L ankle eversion AROM to 12 deg - partially met    LTG's by 19  1) Subjectively report 60% improvement or greater  2) Improve LEFS score to 40/80 or greater  3) Demonstrate improved L ankle MMT to 4+/5 in all planes  4) Ambulate independently without boot, good gait mechanics noted LLE  5) Demonstrate improved L ankle DF AROM to 8 deg or greater  6)  "Perform L SLS on level ground for 20\" or greater without LOB  7) Ascend/descend 5 steps x 5 with reciprocal pattern, no increased pain or compensation noted    Plan  Therapy options: will be seen for skilled physical therapy services  Duration in visits: 10  Treatment plan discussed with: patient  Plan details: Goes back to MD on 10/21/19 - follow up on appt. Did schedule pt for 1 pool visit next week with anticipation of increasing weightbearing on LLE and boot status. Progress strengthening/stability as able.        Visit Diagnoses:    ICD-10-CM ICD-9-CM   1. Closed fracture of fifth metatarsal bone of left foot with routine healing, physeal involvement unspecified, subsequent encounter S92.352D V54.19       Progress per Plan of Care and Progress strengthening /stabilization /functional activity           Timed:  Manual Therapy:    14     mins  59171;  Therapeutic Exercise:    35     mins  70759;     Neuromuscular Liliana:        mins  01901;    Therapeutic Activity:          mins  60856;     Gait Training:           mins  94431;     Ultrasound:          mins  68837;    Electrical Stimulation:         mins  30891 ( );    Untimed:  Electrical Stimulation:         mins  18409 ( );  Mechanical Traction:         mins  90747;     Timed Treatment:   49   mins   Total Treatment:     49   mins  Iona Cook, PT  Physical Therapist                  "

## 2019-10-17 ENCOUNTER — TELEPHONE (OUTPATIENT)
Dept: NEUROLOGY | Facility: CLINIC | Age: 44
End: 2019-10-17

## 2019-10-17 NOTE — TELEPHONE ENCOUNTER
Britta said her headaches have circled back, they made their loop and are back with a vengeance.  She still has Maxalt and Imitrex.  Not all of her headaches are migraines and she would like a script for Esgic Plus, Fioricet, anything better, or the injections that stopped her headaches.  I explained that it has been over 2 years since her last appointment and that we couldn't prescribe anything for her until she was seen in the office.  She expressed understanding and said she will call back to schedule an appointment.  Her mother is having surgery for cancer and she doesn't know when she could come in.  I suggested she go to a walk in clinic or to urgent care.

## 2020-01-08 ENCOUNTER — DOCUMENTATION (OUTPATIENT)
Dept: PHYSICAL THERAPY | Facility: CLINIC | Age: 45
End: 2020-01-08

## 2020-01-08 NOTE — PROGRESS NOTES
Discharge Summary  Discharge Summary from Physical Therapy Report      Dates  PT visit: 09/16/2019    Number of Visits: 8     Discharge Status of Patient: pt had to cx multiple times in a row and did not reschedule    Goals: Partially Met    Discharge Plan: Continue with current home exercise program as instructed    Comments pt had to cx x3 apts due to mothers MD apt and then mothers sx. Never called to reschedule apts.    Date of Discharge 01/08/2020        Sharmin Davenport, PTA  Physical Therapist

## 2023-01-25 ENCOUNTER — OFFICE VISIT (OUTPATIENT)
Dept: GASTROENTEROLOGY | Facility: CLINIC | Age: 48
End: 2023-01-25
Payer: MEDICARE

## 2023-01-25 VITALS
DIASTOLIC BLOOD PRESSURE: 76 MMHG | BODY MASS INDEX: 42.49 KG/M2 | TEMPERATURE: 97.8 F | SYSTOLIC BLOOD PRESSURE: 118 MMHG | WEIGHT: 255 LBS | HEIGHT: 65 IN | OXYGEN SATURATION: 96 % | HEART RATE: 83 BPM

## 2023-01-25 DIAGNOSIS — R10.11 RIGHT UPPER QUADRANT ABDOMINAL PAIN: ICD-10-CM

## 2023-01-25 DIAGNOSIS — R19.7 DIARRHEA, UNSPECIFIED TYPE: ICD-10-CM

## 2023-01-25 DIAGNOSIS — R11.2 NAUSEA AND VOMITING, UNSPECIFIED VOMITING TYPE: Primary | ICD-10-CM

## 2023-01-25 PROBLEM — R10.84 GENERALIZED ABDOMINAL PAIN: Status: ACTIVE | Noted: 2023-01-25

## 2023-01-25 PROCEDURE — 99204 OFFICE O/P NEW MOD 45 MIN: CPT | Performed by: NURSE PRACTITIONER

## 2023-01-25 RX ORDER — DIPHENOXYLATE HYDROCHLORIDE AND ATROPINE SULFATE 2.5; .025 MG/1; MG/1
1 TABLET ORAL AS NEEDED
COMMUNITY
Start: 2023-01-17

## 2023-01-25 RX ORDER — PRAZOSIN HYDROCHLORIDE 1 MG/1
1 CAPSULE ORAL NIGHTLY
COMMUNITY

## 2023-01-25 RX ORDER — TIZANIDINE 4 MG/1
2 TABLET ORAL NIGHTLY
COMMUNITY

## 2023-01-25 RX ORDER — LEVOCETIRIZINE DIHYDROCHLORIDE 5 MG/1
5 TABLET, FILM COATED ORAL EVERY EVENING
COMMUNITY
Start: 2023-01-18

## 2023-01-25 RX ORDER — ONDANSETRON 4 MG/1
4 TABLET, FILM COATED ORAL EVERY 8 HOURS PRN
Qty: 60 TABLET | Refills: 0 | Status: SHIPPED | OUTPATIENT
Start: 2023-01-25

## 2023-01-25 RX ORDER — GABAPENTIN 300 MG/1
1 CAPSULE ORAL 2 TIMES DAILY
COMMUNITY

## 2023-01-25 RX ORDER — DICYCLOMINE HYDROCHLORIDE 10 MG/1
10 CAPSULE ORAL
Qty: 120 CAPSULE | Refills: 1 | Status: SHIPPED | OUTPATIENT
Start: 2023-01-25 | End: 2023-02-02 | Stop reason: HOSPADM

## 2023-01-25 RX ORDER — MONTELUKAST SODIUM 4 MG/1
1 TABLET, CHEWABLE ORAL 2 TIMES DAILY
COMMUNITY

## 2023-01-25 RX ORDER — LORAZEPAM 1 MG/1
1 TABLET ORAL EVERY 8 HOURS PRN
COMMUNITY

## 2023-01-25 RX ORDER — BREXPIPRAZOLE 1 MG/1
1 TABLET ORAL NIGHTLY
COMMUNITY
Start: 2023-01-18

## 2023-01-25 RX ORDER — CARIPRAZINE 3 MG/1
3 CAPSULE, GELATIN COATED ORAL NIGHTLY
COMMUNITY
Start: 2023-01-18

## 2023-01-25 RX ORDER — OLANZAPINE 5 MG/1
5 TABLET ORAL NIGHTLY
COMMUNITY

## 2023-01-25 RX ORDER — PROPRANOLOL HYDROCHLORIDE 40 MG/1
1 TABLET ORAL 2 TIMES DAILY
COMMUNITY
End: 2023-01-25 | Stop reason: SDUPTHER

## 2023-01-25 NOTE — PROGRESS NOTES
"Primary Physician: Phuong Fernando MD    Chief Complaint   Patient presents with   • Diarrhea     Pt c/o diarrhea for over a year now-states it comes in cycles but this time has lasted about a month; Pt states she will wake up in the middle of the night and have to run to the bathroom-also states her stools are \"willett kathy yellow color\"; Pt states she had an endo/colon at [facility] probably 20 years ago-states they both came back normal    • Nausea     Pt also c/o nausea for over a year now-states she is constantly nauseated but vomits at least once daily-also sees to come in cycles; Pt had abdominal US        Subjective     Britta Patel is a 47 y.o. female.    HPI   Diarrhea  Has had chronic diarrhea for years even had colonoscopy 20+ years ago at the [facility].  She was told it was normal at that time.  Diarrhea is chronic however recently has been much more severe.  Tells me the stool colors are yellow.  No blood in stool.  Pt has urgency with her BM's.      Nausea/Vomitting  Patient has had nausea for over a year and does have about 1 episode a day of vomiting.  It is intermittent in nature and she will go a few days with no nausea or vomiting then she will have days where she feels terribly sick.  Had an Abdominal ultrasound at the [facility]: normal gallbladder      Lower Abd Pain  Pt has lower abdominal pain nearly daily.  At times having a BM does help.  Pt has had total hysterectomy.  Labs at the [facility] 12/29/2022: Normal amylase and lipase, normal sedimentation rate, normal CBC, normal LFTs    No family hx colon cancer, UC or IBD.    Past Medical History:   Diagnosis Date   • Allergic rhinitis    • Anxiety    • Depression    • Family history of colonic polyps    • History of electroconvulsive therapy    • Hypertension    • Insomnia    • Migraine    • PTSD (post-traumatic stress disorder)    • Seizures (HCC)    • Tachycardia        Past Surgical " History:   Procedure Laterality Date   • APPENDECTOMY     • FOOT FRACTURE SURGERY Left    • FOOT SURGERY Right     Cartilage repair   • HYSTERECTOMY          Current Outpatient Medications:   •  colestipol (COLESTID) 1 g tablet, Take 1 g by mouth 2 (Two) Times a Day., Disp: , Rfl:   •  diphenoxylate-atropine (LOMOTIL) 2.5-0.025 MG per tablet, Take 1 tablet by mouth As Needed., Disp: , Rfl:   •  gabapentin (NEURONTIN) 300 MG capsule, Take 1 capsule by mouth 2 (Two) Times a Day., Disp: , Rfl:   •  hydrALAZINE (APRESOLINE) 50 MG tablet, Take 50 mg by mouth 2 (Two) Times a Day., Disp: , Rfl:   •  levocetirizine (XYZAL) 5 MG tablet, Take 5 mg by mouth Every Evening., Disp: , Rfl:   •  LORazepam (ATIVAN) 1 MG tablet, Take 1 mg by mouth Every 8 (Eight) Hours As Needed for Anxiety., Disp: , Rfl:   •  OLANZapine (zyPREXA) 5 MG tablet, Take 5 mg by mouth Every Night., Disp: , Rfl:   •  prazosin (MINIPRESS) 1 MG capsule, Take 1 capsule by mouth Every Night., Disp: , Rfl:   •  propranolol (INDERAL) 40 MG tablet, Take 40 mg by mouth 2 (Two) Times a Day., Disp: , Rfl:   •  QUEtiapine (SEROquel) 300 MG tablet, Take 600 mg by mouth Every Night., Disp: , Rfl:   •  Rexulti 1 MG tablet, Take 1 tablet by mouth Every Night., Disp: , Rfl:   •  rizatriptan (MAXALT) 10 MG tablet, TAKE 1 TABLET BY MOUTH 1 TIME AS NEEDED FOR MIGRAINE FOR UP TO 1 DOSE. MAY REPEAT IN 2 HOURS IF NEEDED (Patient taking differently: Take 10 mg by mouth As Needed.), Disp: 9 tablet, Rfl: 0  •  tiZANidine (ZANAFLEX) 4 MG tablet, Take 2 tablets by mouth Every Night., Disp: , Rfl:   •  Vraylar 3 MG capsule capsule, Take 3 mg by mouth Every Night., Disp: , Rfl:   •  dicyclomine (BENTYL) 10 MG capsule, Take 1 capsule by mouth 4 (Four) Times a Day Before Meals & at Bedtime., Disp: 120 capsule, Rfl: 1  •  ondansetron (Zofran) 4 MG tablet, Take 1 tablet by mouth Every 8 (Eight) Hours As Needed for Nausea or Vomiting., Disp: 60 tablet, Rfl: 0    Allergies   Allergen  "Reactions   • Stadol [Butorphanol] Other (See Comments)     Unknown        Social History     Socioeconomic History   • Marital status:    Tobacco Use   • Smoking status: Former     Types: Cigarettes   • Smokeless tobacco: Never   Vaping Use   • Vaping Use: Every day   • Substances: Nicotine   Substance and Sexual Activity   • Alcohol use: Not Currently   • Drug use: No   • Sexual activity: Not Currently     Partners: Male     Birth control/protection: Surgical       Family History   Problem Relation Age of Onset   • Multiple sclerosis Mother    • Irritable bowel syndrome Mother    • No Known Problems Father    • Alcohol abuse Maternal Grandfather    • Colon polyps Paternal Grandfather         > 60 years of age   • Diverticulitis Paternal Grandfather    • Colon cancer Neg Hx    • Esophageal cancer Neg Hx    • Liver cancer Neg Hx    • Stomach cancer Neg Hx    • Rectal cancer Neg Hx    • Liver disease Neg Hx        Review of Systems   Constitutional: Negative for unexpected weight change.   Respiratory: Negative for shortness of breath.    Cardiovascular: Negative for chest pain.   Gastrointestinal: Positive for diarrhea and nausea.       Objective     /76 (BP Location: Left arm, Patient Position: Sitting, Cuff Size: Adult)   Pulse 83   Temp 97.8 °F (36.6 °C) (Infrared)   Ht 165.1 cm (65\")   Wt 116 kg (255 lb)   LMP  (LMP Unknown)   SpO2 96%   Breastfeeding No   BMI 42.43 kg/m²     Physical Exam  Vitals reviewed.   Constitutional:       Appearance: Normal appearance.   Cardiovascular:      Rate and Rhythm: Normal rate and regular rhythm.      Heart sounds: Normal heart sounds.   Pulmonary:      Effort: Pulmonary effort is normal.      Breath sounds: Normal breath sounds.   Neurological:      Mental Status: She is alert.               IMPRESSION/PLAN:    Assessment & Plan      Problem List Items Addressed This Visit        Gastrointestinal Abdominal     Nausea & vomiting - Primary    Overview     " Abdominal ultrasound at the [facility]: normal gallbladder    [facility] 12/29/2022: Normal amylase and lipase, normal sedimentation rate, normal CBC, normal LFTs             Relevant Medications    dicyclomine (BENTYL) 10 MG capsule    ondansetron (Zofran) 4 MG tablet    Other Relevant Orders    Case Request (Completed)    Generalized abdominal pain    Overview     Labs at the [facility] 12/29/2022: Normal amylase and lipase, normal sedimentation rate, normal CBC, normal LFTs         Diarrhea    Overview     Chronic diarrhea 20+ year  Had colonoscopy 20 years ago normal per pt, no records         Relevant Medications    dicyclomine (BENTYL) 10 MG capsule    Other Relevant Orders    Case Request (Completed)     Endoscopy/colonoscopy per Dr. Beltrán  Went to see if maybe an irritable bowel medicine might help. Start Bentyl 10mg before meals and bedtime if needed.  Will give her some Zofran  Follow up in 3 months  Avoid caffeine        ..The risks, benefits, and alternatives of colonoscopy were reviewed with the patient today.  Risks including perforation of the colon possibly requiring surgery or colostomy.  Additional risks include risk of bleeding from biopsies or removal of colon tissue.  There is also the risk of a drug reaction or problems with anesthesia.  This will be discussed with the further by the anesthesia team on the day of the procedure.  Lastly there is a possibility of missing a colon polyp or cancer.  The benefits include the diagnosis and management of disease of the colon and rectum.  Alternatives to colonoscopy include barium enema, laboratory testing, radiographic evaluation, or no intervention.  The patient verbalizes understanding and agrees.    In accordance with requirements under the Affordable Care Act, [facility] has provided pricing for all hospital services and items on each of its websites. However, a patient's actual cost may differ based on the services the patient  receives to meet individual healthcare needs and based on the benefits provided under the patient’s insurance coverage.        Berenice Mckinney, APRN  01/26/23  16:10 CST    Part of this note may be an electronic transcription/translation of spoken language to printed text.

## 2023-02-02 ENCOUNTER — ANESTHESIA EVENT (OUTPATIENT)
Dept: GASTROENTEROLOGY | Facility: HOSPITAL | Age: 48
End: 2023-02-02
Payer: MEDICARE

## 2023-02-02 ENCOUNTER — ANESTHESIA (OUTPATIENT)
Dept: GASTROENTEROLOGY | Facility: HOSPITAL | Age: 48
End: 2023-02-02
Payer: MEDICARE

## 2023-02-02 ENCOUNTER — HOSPITAL ENCOUNTER (OUTPATIENT)
Facility: HOSPITAL | Age: 48
Setting detail: HOSPITAL OUTPATIENT SURGERY
Discharge: HOME | End: 2023-02-02
Attending: INTERNAL MEDICINE | Admitting: INTERNAL MEDICINE
Payer: MEDICARE

## 2023-02-02 VITALS
RESPIRATION RATE: 20 BRPM | SYSTOLIC BLOOD PRESSURE: 115 MMHG | OXYGEN SATURATION: 98 % | TEMPERATURE: 97 F | BODY MASS INDEX: 41.99 KG/M2 | WEIGHT: 252 LBS | HEIGHT: 65 IN | HEART RATE: 88 BPM | DIASTOLIC BLOOD PRESSURE: 75 MMHG

## 2023-02-02 DIAGNOSIS — R11.2 NAUSEA AND VOMITING, UNSPECIFIED VOMITING TYPE: ICD-10-CM

## 2023-02-02 DIAGNOSIS — R19.7 DIARRHEA, UNSPECIFIED TYPE: ICD-10-CM

## 2023-02-02 DIAGNOSIS — R10.11 RIGHT UPPER QUADRANT ABDOMINAL PAIN: ICD-10-CM

## 2023-02-02 PROBLEM — K20.90 ESOPHAGITIS: Status: ACTIVE | Noted: 2023-02-02

## 2023-02-02 PROCEDURE — 88305 TISSUE EXAM BY PATHOLOGIST: CPT | Performed by: INTERNAL MEDICINE

## 2023-02-02 PROCEDURE — 43239 EGD BIOPSY SINGLE/MULTIPLE: CPT | Performed by: INTERNAL MEDICINE

## 2023-02-02 PROCEDURE — 25010000002 PROPOFOL 10 MG/ML EMULSION: Performed by: NURSE ANESTHETIST, CERTIFIED REGISTERED

## 2023-02-02 PROCEDURE — 45380 COLONOSCOPY AND BIOPSY: CPT | Performed by: INTERNAL MEDICINE

## 2023-02-02 PROCEDURE — 87081 CULTURE SCREEN ONLY: CPT | Performed by: INTERNAL MEDICINE

## 2023-02-02 RX ORDER — SODIUM CHLORIDE 0.9 % (FLUSH) 0.9 %
10 SYRINGE (ML) INJECTION AS NEEDED
Status: DISCONTINUED | OUTPATIENT
Start: 2023-02-02 | End: 2023-02-02 | Stop reason: HOSPADM

## 2023-02-02 RX ORDER — PANTOPRAZOLE SODIUM 40 MG/1
40 TABLET, DELAYED RELEASE ORAL DAILY
Qty: 30 TABLET | Refills: 11 | Status: SHIPPED | OUTPATIENT
Start: 2023-02-02

## 2023-02-02 RX ORDER — PROPOFOL 10 MG/ML
VIAL (ML) INTRAVENOUS AS NEEDED
Status: DISCONTINUED | OUTPATIENT
Start: 2023-02-02 | End: 2023-02-02 | Stop reason: SURG

## 2023-02-02 RX ORDER — LIDOCAINE HYDROCHLORIDE 20 MG/ML
INJECTION, SOLUTION EPIDURAL; INFILTRATION; INTRACAUDAL; PERINEURAL AS NEEDED
Status: DISCONTINUED | OUTPATIENT
Start: 2023-02-02 | End: 2023-02-02 | Stop reason: SURG

## 2023-02-02 RX ORDER — SODIUM CHLORIDE 0.9 % (FLUSH) 0.9 %
10 SYRINGE (ML) INJECTION EVERY 12 HOURS SCHEDULED
Status: DISCONTINUED | OUTPATIENT
Start: 2023-02-02 | End: 2023-02-02 | Stop reason: HOSPADM

## 2023-02-02 RX ORDER — SODIUM CHLORIDE 9 MG/ML
40 INJECTION, SOLUTION INTRAVENOUS AS NEEDED
Status: DISCONTINUED | OUTPATIENT
Start: 2023-02-02 | End: 2023-02-02 | Stop reason: HOSPADM

## 2023-02-02 RX ORDER — SODIUM CHLORIDE 9 MG/ML
100 INJECTION, SOLUTION INTRAVENOUS CONTINUOUS
Status: DISCONTINUED | OUTPATIENT
Start: 2023-02-02 | End: 2023-02-02 | Stop reason: HOSPADM

## 2023-02-02 RX ADMIN — SODIUM CHLORIDE 100 ML/HR: 9 INJECTION, SOLUTION INTRAVENOUS at 09:48

## 2023-02-02 RX ADMIN — LIDOCAINE HYDROCHLORIDE 60 MG: 20 INJECTION, SOLUTION EPIDURAL; INFILTRATION; INTRACAUDAL; PERINEURAL at 10:06

## 2023-02-02 RX ADMIN — PROPOFOL INJECTABLE EMULSION 200 MG: 10 INJECTION, EMULSION INTRAVENOUS at 10:03

## 2023-02-02 RX ADMIN — PROPOFOL INJECTABLE EMULSION 200 MG: 10 INJECTION, EMULSION INTRAVENOUS at 10:09

## 2023-02-02 NOTE — DISCHARGE INSTRUCTIONS
Follow Dr. Beltrán's discharge instructions.    Stop Bentyl    Start Protonix 40 mg by mouth daily on am on empty stomach.    Liquid imodium 1/4 to 1/2 dose: use as directed by Dr. Beltrán.    Return to see Anige on May 4 at 9:30.

## 2023-02-02 NOTE — ANESTHESIA POSTPROCEDURE EVALUATION
Patient: Britta Patel    Procedure Summary     Date: 02/02/23 Room / Location: [facility] ENDOSCOPY 6 / [facility] END[facility]    Anesthesia Start: 1003 Anesthesia Stop: 1035    Procedures:       ESOPHAGOGASTRODUODENOSCOPY WITH ANESTHESIA      COLONOSCOPY WITH ANESTHESIA Diagnosis:       Nausea and vomiting, unspecified vomiting type      Right upper quadrant abdominal pain      Diarrhea, unspecified type      (Nausea and vomiting, unspecified vomiting type [R11.2])      (Right upper quadrant abdominal pain [R10.11])      (Diarrhea, unspecified type [R19.7])    Surgeons: Jada Beltrán MD Provider: Alberto Spear CRNA    Anesthesia Type: MAC ASA Status: 3          Anesthesia Type: MAC    Vitals  Vitals Value Taken Time   /64 02/02/23 1034   Temp     Pulse 97 02/02/23 1035   Resp 17 02/02/23 1034   SpO2 99 % 02/02/23 1034   Vitals shown include unvalidated device data.        Post Anesthesia Care and Evaluation    Patient location during evaluation: PHASE II  Patient participation: complete - patient participated  Level of consciousness: awake and alert  Pain score: 0  Pain management: adequate    Airway patency: patent  Anesthetic complications: No anesthetic complications    Cardiovascular status: acceptable and stable  Respiratory status: room air  Hydration status: acceptable

## 2023-02-03 LAB
CYTO UR: NORMAL
LAB AP CASE REPORT: NORMAL
Lab: NORMAL
PATH REPORT.FINAL DX SPEC: NORMAL
PATH REPORT.GROSS SPEC: NORMAL
UREASE TISS QL: NEGATIVE

## 2023-03-13 DIAGNOSIS — R10.84 GENERALIZED ABDOMINAL PAIN: Primary | ICD-10-CM

## 2023-03-13 RX ORDER — DICYCLOMINE HYDROCHLORIDE 10 MG/1
1 CAPSULE ORAL 4 TIMES DAILY
COMMUNITY
Start: 2023-02-21 | End: 2023-03-13 | Stop reason: SDUPTHER

## 2023-03-13 RX ORDER — DICYCLOMINE HYDROCHLORIDE 10 MG/1
10 CAPSULE ORAL
Qty: 120 CAPSULE | Refills: 6 | Status: SHIPPED | OUTPATIENT
Start: 2023-03-13

## 2023-03-13 NOTE — TELEPHONE ENCOUNTER
Rx Refill Note  Requested Prescriptions     Pending Prescriptions Disp Refills   • dicyclomine (BENTYL) 10 MG capsule 120 capsule 6     Sig: Take 1 capsule by mouth 4 (Four) Times a Day Before Meals & at Bedtime.      Last office visit with prescribing clinician: 1/25/2023     Last telemedicine visit with prescribing clinician:     Next office visit with prescribing clinician: 5/4/2023     Pt just seen in office and has upcoming f/u with [facility]. I rec'd fax from [facility] requesting refills on Dicyclomine-I have pended refills to [facility].                           Would you like a call back once the refill request has been completed: [] Yes [] No    If the office needs to give you a call back, can they leave a voicemail: [] Yes [] No    Darlene Sesay MA  03/13/23, 15:49 CDT

## 2023-05-01 ENCOUNTER — TELEPHONE (OUTPATIENT)
Dept: GASTROENTEROLOGY | Facility: CLINIC | Age: 48
End: 2023-05-01

## 2023-05-01 NOTE — TELEPHONE ENCOUNTER
“Please be informed that patient has passed. Patient has been marked  in the system. The date of death is: 23    Caller: Therese Patel    Relationship: Mother    Best call back number: [phone]

## (undated) DEVICE — CONMED SCOPE SAVER BITE BLOCK, 20X27 MM: Brand: SCOPE SAVER

## (undated) DEVICE — MASK,OXYGEN,MED CONC,ADLT,7' TUB, UC: Brand: PENDING

## (undated) DEVICE — Device: Brand: DEFENDO AIR/WATER/SUCTION AND BIOPSY VALVE

## (undated) DEVICE — CUFF,BP,DISP,1 TUBE,ADULT,HP: Brand: MEDLINE

## (undated) DEVICE — SENSR O2 OXIMAX FNGR A/ 18IN NONSTR

## (undated) DEVICE — FRCP BIOP ENDO CAPTURAPRO SPK SERR 2.8MM 230CM

## (undated) DEVICE — YANKAUER,BULB TIP WITH VENT: Brand: ARGYLE

## (undated) DEVICE — THE CHANNEL CLEANING BRUSH IS A NYLON FLEXI BRUSH ATTACHED TO A FLEXIBLE PLASTIC SHEATH DESIGNED TO SAFELY REMOVE DEBRIS FROM FLEXIBLE ENDOSCOPES.